# Patient Record
Sex: FEMALE | Race: WHITE
[De-identification: names, ages, dates, MRNs, and addresses within clinical notes are randomized per-mention and may not be internally consistent; named-entity substitution may affect disease eponyms.]

---

## 2023-08-15 PROBLEM — Z00.00 ENCOUNTER FOR PREVENTIVE HEALTH EXAMINATION: Status: ACTIVE | Noted: 2023-08-15

## 2023-10-02 ENCOUNTER — APPOINTMENT (OUTPATIENT)
Dept: ORTHOPEDIC SURGERY | Facility: CLINIC | Age: 48
End: 2023-10-02
Payer: COMMERCIAL

## 2023-10-02 VITALS — BODY MASS INDEX: 26.52 KG/M2 | WEIGHT: 175 LBS | HEIGHT: 68 IN

## 2023-10-02 DIAGNOSIS — M67.441 GANGLION, RIGHT HAND: ICD-10-CM

## 2023-10-02 PROCEDURE — 73140 X-RAY EXAM OF FINGER(S): CPT | Mod: RT

## 2023-10-02 PROCEDURE — 99202 OFFICE O/P NEW SF 15 MIN: CPT

## 2024-07-25 ENCOUNTER — APPOINTMENT (OUTPATIENT)
Dept: RHEUMATOLOGY | Facility: CLINIC | Age: 49
End: 2024-07-25
Payer: COMMERCIAL

## 2024-07-25 VITALS
BODY MASS INDEX: 27.47 KG/M2 | HEIGHT: 67 IN | SYSTOLIC BLOOD PRESSURE: 113 MMHG | HEART RATE: 79 BPM | DIASTOLIC BLOOD PRESSURE: 73 MMHG | WEIGHT: 175 LBS | TEMPERATURE: 98.3 F | OXYGEN SATURATION: 97 %

## 2024-07-25 DIAGNOSIS — Z78.9 OTHER SPECIFIED HEALTH STATUS: ICD-10-CM

## 2024-07-25 DIAGNOSIS — M79.642 PAIN IN RIGHT HAND: ICD-10-CM

## 2024-07-25 DIAGNOSIS — S73.199A OTHER SPRAIN OF UNSPECIFIED HIP, INITIAL ENCOUNTER: ICD-10-CM

## 2024-07-25 DIAGNOSIS — R76.8 OTHER SPECIFIED ABNORMAL IMMUNOLOGICAL FINDINGS IN SERUM: ICD-10-CM

## 2024-07-25 DIAGNOSIS — S73.192D OTHER SPRAIN OF LEFT HIP, SUBSEQUENT ENCOUNTER: ICD-10-CM

## 2024-07-25 DIAGNOSIS — M79.641 PAIN IN RIGHT HAND: ICD-10-CM

## 2024-07-25 DIAGNOSIS — M54.50 LOW BACK PAIN, UNSPECIFIED: ICD-10-CM

## 2024-07-25 DIAGNOSIS — Z00.00 ENCOUNTER FOR GENERAL ADULT MEDICAL EXAMINATION W/OUT ABNORMAL FINDINGS: ICD-10-CM

## 2024-07-25 DIAGNOSIS — M54.2 CERVICALGIA: ICD-10-CM

## 2024-07-25 DIAGNOSIS — Z82.69 FAMILY HISTORY OF OTHER DISEASES OF THE MUSCULOSKELETAL SYSTEM AND CONNECTIVE TISSUE: ICD-10-CM

## 2024-07-25 PROCEDURE — 99204 OFFICE O/P NEW MOD 45 MIN: CPT

## 2024-07-25 NOTE — ASSESSMENT
[FreeTextEntry1] : INGA 1:80: Pt's previous lab findings are non-specific, and her symptoms are not suggestive of either inflammatory arthritis or a systemic connective tissue disease. She previously had MRIs of her c-spine and t-spine in 2020 which demonstrated herniated discs with some thecal sac compression, also has some symptoms suggestive of muscle tension.  - f/u additional labs for systemic connective tissue diseases - f/u new x-rays of c-spine and t-spine, also of hands - Also referred pt for L hip MRI given that her symptoms did not fully improve with PT  f/u prn, will call pt with lab, x-ray and MRI results

## 2024-07-25 NOTE — HISTORY OF PRESENT ILLNESS
[FreeTextEntry1] : Pt has had pain in her neck and upper back for many years, also with headaches and episodes of headaches with floaters and vision changes. She previously had MRIs of her c-spine and t-spine in 2020 which demonstrated herniated discs with some indentation of the thecal sac, also saw a neuro-ophthalmologist who thought she had ocular migraines. She went for PT and had two sinus surgeries with improvement of her headaches, but she still has pain in her back between the shoulder blades and in her neck, and it feels bruised. + Also lateral L hip pain worse with walking and certain exercises. Pt was previously diagnosed with bursitis, went for PT with some improvement but she still has pain. Also had an x-ray reportedly with no abnormal findings. + Pruritic areas on her hands and shin which improve with hydrocortisone cream. + Stiffness in the hands in the mornings which improves within a few minutes. + Numbness in b/l 4th and 5th fingers at night. + "bumps" on R 2nd finger, pt saw hand surgery and was told she has ganglion cysts. Pt had an evaluation with her PMD in 2022 and had labs which demonstrated INGA 1:80, negative RF, normal ESR.  Pt denies Raynaud's, dysphagia, diarrhea, hematochezia, h/o blood clots or miscarriages, no known family h/o autoimmune disease.  Physical exam: GEN: pleasant, AAO woman sitting on exam table in NAD SKIN: no rashes HEAD: no alopecia MOUTH: Moist mucous membranes, no oral ulcers, normal oral aperture PULM: Clear to auscultation b/l CV: Regular rate and rhythm, no murmurs MSK Shoulders: Full ROM b/l but with pain on full abduction in upper back Elbows: Full ROM b/l, no effusions Wrists: Full ROM b/l, no effusions Hands; no synovitis Hips: Full ROM b/l Knees: no effusions, full ROM b/l Ankles: no effusions, full ROM b/l Feet; no effusions, no TTP EXT: normal nailfold capillaries, no LE edema b/l

## 2024-09-03 ENCOUNTER — NON-APPOINTMENT (OUTPATIENT)
Age: 49
End: 2024-09-03

## 2024-09-16 ENCOUNTER — NON-APPOINTMENT (OUTPATIENT)
Age: 49
End: 2024-09-16

## 2024-09-17 ENCOUNTER — APPOINTMENT (OUTPATIENT)
Dept: ORTHOPEDIC SURGERY | Facility: CLINIC | Age: 49
End: 2024-09-17
Payer: COMMERCIAL

## 2024-09-17 ENCOUNTER — NON-APPOINTMENT (OUTPATIENT)
Age: 49
End: 2024-09-17

## 2024-09-17 DIAGNOSIS — M25.552 PAIN IN LEFT HIP: ICD-10-CM

## 2024-09-17 PROCEDURE — 99203 OFFICE O/P NEW LOW 30 MIN: CPT

## 2024-09-17 PROCEDURE — 73522 X-RAY EXAM HIPS BI 3-4 VIEWS: CPT

## 2024-09-17 RX ORDER — MELOXICAM 15 MG/1
15 TABLET ORAL
Qty: 30 | Refills: 0 | Status: ACTIVE | COMMUNITY
Start: 2024-09-17 | End: 1900-01-01

## 2024-09-17 NOTE — HISTORY OF PRESENT ILLNESS
Results have been released via [employer]. Please verify that these have been viewed by patient. If not, please call patient with results.    I have sent a message to them with the following interpretation (see below).    I have reviewed your recent lab results.    Electrolytes, glucose and kidney function remain within normal limits.      Please do not hesitate to call or message with any additional questions or concerns.    Georgina Sanders MD [de-identified] : Patient is a 48-year-old female here for evaluation of left hip.  Patient states that she has left hip pain that comes and goes, patient has been having this pain for a few years without any injury or trauma.  Patient recently followed up with her rheumatologist and had an MRI of the left hip showing mild arthritis, femoral head chondral wear, edema, hamstring insertional tendinitis, inflammation of the gluteus insertion at greater trochanter.  Patient states that pain is random comes and goes. Denies numbness tingling, denies instability   Left hip exam: No effusion no ecchymosis no erythema, no tense palpation, good range of motion with mild pain to hip flexors, no gross instability, neurovascular intact  X-ray bilateral hips for comparison: No acute fractures, subluxations, dislocations.  Mild osteoarthritis left hip   Discussed treatment options in detail patient including rest, ice/heat, range her exercise, physical therapy.  Meloxicam sent to patient's pharmacy, discussed side effects in detail.  Patient will follow-up for repeat evaluation a few months.  Advised to refrain from high-impact activities until feeling better.  She is seen with Dr. Montague